# Patient Record
Sex: FEMALE | Race: WHITE | NOT HISPANIC OR LATINO
[De-identification: names, ages, dates, MRNs, and addresses within clinical notes are randomized per-mention and may not be internally consistent; named-entity substitution may affect disease eponyms.]

---

## 2017-03-23 ENCOUNTER — APPOINTMENT (OUTPATIENT)
Dept: ORTHOPEDIC SURGERY | Facility: CLINIC | Age: 77
End: 2017-03-23

## 2017-03-23 VITALS
WEIGHT: 163 LBS | BODY MASS INDEX: 23.34 KG/M2 | HEART RATE: 57 BPM | TEMPERATURE: 97.9 F | HEIGHT: 70 IN | SYSTOLIC BLOOD PRESSURE: 155 MMHG | DIASTOLIC BLOOD PRESSURE: 70 MMHG

## 2017-03-23 DIAGNOSIS — Z86.79 PERSONAL HISTORY OF OTHER DISEASES OF THE CIRCULATORY SYSTEM: ICD-10-CM

## 2017-03-23 DIAGNOSIS — E78.00 PURE HYPERCHOLESTEROLEMIA, UNSPECIFIED: ICD-10-CM

## 2017-03-23 DIAGNOSIS — M54.5 LOW BACK PAIN: ICD-10-CM

## 2017-03-23 DIAGNOSIS — M17.10 UNILATERAL PRIMARY OSTEOARTHRITIS, UNSPECIFIED KNEE: ICD-10-CM

## 2017-03-23 DIAGNOSIS — Z78.9 OTHER SPECIFIED HEALTH STATUS: ICD-10-CM

## 2017-03-23 DIAGNOSIS — Z85.9 PERSONAL HISTORY OF MALIGNANT NEOPLASM, UNSPECIFIED: ICD-10-CM

## 2017-03-23 RX ORDER — HYDRALAZINE HYDROCHLORIDE 50 MG/1
50 TABLET ORAL
Refills: 0 | Status: ACTIVE | COMMUNITY

## 2017-03-23 RX ORDER — LEVOTHYROXINE SODIUM 0.05 MG/1
50 TABLET ORAL
Refills: 0 | Status: ACTIVE | COMMUNITY

## 2017-03-23 RX ORDER — LISINOPRIL AND HYDROCHLOROTHIAZIDE TABLETS 20; 12.5 MG/1; MG/1
20-12.5 TABLET ORAL
Refills: 0 | Status: ACTIVE | COMMUNITY

## 2017-03-23 RX ORDER — METOPROLOL TARTRATE 50 MG/1
50 TABLET, FILM COATED ORAL
Refills: 0 | Status: ACTIVE | COMMUNITY

## 2017-05-17 ENCOUNTER — APPOINTMENT (OUTPATIENT)
Dept: ORTHOPEDIC SURGERY | Facility: CLINIC | Age: 77
End: 2017-05-17

## 2017-05-17 VITALS
DIASTOLIC BLOOD PRESSURE: 77 MMHG | BODY MASS INDEX: 23.34 KG/M2 | SYSTOLIC BLOOD PRESSURE: 166 MMHG | HEIGHT: 70 IN | WEIGHT: 163 LBS | HEART RATE: 60 BPM

## 2017-05-17 DIAGNOSIS — M41.80 OTHER FORMS OF SCOLIOSIS, SITE UNSPECIFIED: ICD-10-CM

## 2017-05-17 DIAGNOSIS — M60.9 MYOSITIS, UNSPECIFIED: ICD-10-CM

## 2017-05-17 RX ORDER — METHYLPREDNISOLONE 4 MG/1
4 TABLET ORAL
Qty: 1 | Refills: 0 | Status: DISCONTINUED | COMMUNITY
Start: 2017-05-11 | End: 2017-05-17

## 2017-05-17 RX ORDER — OXYCODONE AND ACETAMINOPHEN 5; 325 MG/1; MG/1
5-325 TABLET ORAL EVERY 6 HOURS
Qty: 20 | Refills: 0 | Status: ACTIVE | COMMUNITY
Start: 2017-05-17 | End: 1900-01-01

## 2017-08-04 ENCOUNTER — INPATIENT (INPATIENT)
Facility: HOSPITAL | Age: 77
LOS: 1 days | Discharge: HOME HEALTH | DRG: 200 | End: 2017-08-06
Attending: OBSTETRICS & GYNECOLOGY | Admitting: EMERGENCY MEDICINE
Payer: MEDICARE

## 2017-08-04 VITALS
WEIGHT: 160.06 LBS | HEART RATE: 71 BPM | TEMPERATURE: 98 F | DIASTOLIC BLOOD PRESSURE: 64 MMHG | OXYGEN SATURATION: 95 % | RESPIRATION RATE: 18 BRPM | HEIGHT: 70 IN | SYSTOLIC BLOOD PRESSURE: 107 MMHG

## 2017-08-04 DIAGNOSIS — J93.9 PNEUMOTHORAX, UNSPECIFIED: ICD-10-CM

## 2017-08-04 DIAGNOSIS — C54.1 MALIGNANT NEOPLASM OF ENDOMETRIUM: ICD-10-CM

## 2017-08-04 LAB
ALBUMIN SERPL ELPH-MCNC: 3.6 G/DL — SIGNIFICANT CHANGE UP (ref 3.3–5.2)
ALP SERPL-CCNC: 88 U/L — SIGNIFICANT CHANGE UP (ref 40–120)
ALT FLD-CCNC: 27 U/L — SIGNIFICANT CHANGE UP
ANION GAP SERPL CALC-SCNC: 20 MMOL/L — HIGH (ref 5–17)
AST SERPL-CCNC: 25 U/L — SIGNIFICANT CHANGE UP
BASOPHILS # BLD AUTO: 0 K/UL — SIGNIFICANT CHANGE UP (ref 0–0.2)
BASOPHILS NFR BLD AUTO: 0.7 % — SIGNIFICANT CHANGE UP (ref 0–2)
BILIRUB SERPL-MCNC: 0.1 MG/DL — LOW (ref 0.4–2)
BUN SERPL-MCNC: 26 MG/DL — HIGH (ref 8–20)
CALCIUM SERPL-MCNC: 9.1 MG/DL — SIGNIFICANT CHANGE UP (ref 8.6–10.2)
CHLORIDE SERPL-SCNC: 99 MMOL/L — SIGNIFICANT CHANGE UP (ref 98–107)
CO2 SERPL-SCNC: 23 MMOL/L — SIGNIFICANT CHANGE UP (ref 22–29)
CREAT SERPL-MCNC: 0.9 MG/DL — SIGNIFICANT CHANGE UP (ref 0.5–1.3)
EOSINOPHIL # BLD AUTO: 0.3 K/UL — SIGNIFICANT CHANGE UP (ref 0–0.5)
EOSINOPHIL NFR BLD AUTO: 3.9 % — SIGNIFICANT CHANGE UP (ref 0–6)
GLUCOSE SERPL-MCNC: 78 MG/DL — SIGNIFICANT CHANGE UP (ref 70–115)
HCT VFR BLD CALC: 33.5 % — LOW (ref 37–47)
HGB BLD-MCNC: 10.9 G/DL — LOW (ref 12–16)
LYMPHOCYTES # BLD AUTO: 1.7 K/UL — SIGNIFICANT CHANGE UP (ref 1–4.8)
LYMPHOCYTES # BLD AUTO: 22.5 % — SIGNIFICANT CHANGE UP (ref 20–55)
MCHC RBC-ENTMCNC: 30.4 PG — SIGNIFICANT CHANGE UP (ref 27–31)
MCHC RBC-ENTMCNC: 32.5 G/DL — SIGNIFICANT CHANGE UP (ref 32–36)
MCV RBC AUTO: 93.6 FL — SIGNIFICANT CHANGE UP (ref 81–99)
MONOCYTES # BLD AUTO: 0.9 K/UL — HIGH (ref 0–0.8)
MONOCYTES NFR BLD AUTO: 11.7 % — HIGH (ref 3–10)
NEUTROPHILS # BLD AUTO: 4.5 K/UL — SIGNIFICANT CHANGE UP (ref 1.8–8)
NEUTROPHILS NFR BLD AUTO: 60.8 % — SIGNIFICANT CHANGE UP (ref 37–73)
PLATELET # BLD AUTO: 485 K/UL — HIGH (ref 150–400)
POTASSIUM SERPL-MCNC: 3.2 MMOL/L — LOW (ref 3.5–5.3)
POTASSIUM SERPL-SCNC: 3.2 MMOL/L — LOW (ref 3.5–5.3)
PROT SERPL-MCNC: 7.3 G/DL — SIGNIFICANT CHANGE UP (ref 6.6–8.7)
RBC # BLD: 3.58 M/UL — LOW (ref 4.4–5.2)
RBC # FLD: 13.5 % — SIGNIFICANT CHANGE UP (ref 11–15.6)
SODIUM SERPL-SCNC: 142 MMOL/L — SIGNIFICANT CHANGE UP (ref 135–145)
WBC # BLD: 7.4 K/UL — SIGNIFICANT CHANGE UP (ref 4.8–10.8)
WBC # FLD AUTO: 7.4 K/UL — SIGNIFICANT CHANGE UP (ref 4.8–10.8)

## 2017-08-04 PROCEDURE — 99222 1ST HOSP IP/OBS MODERATE 55: CPT

## 2017-08-04 PROCEDURE — 99284 EMERGENCY DEPT VISIT MOD MDM: CPT

## 2017-08-04 PROCEDURE — 71020: CPT | Mod: 26

## 2017-08-04 PROCEDURE — 71250 CT THORAX DX C-: CPT | Mod: 26

## 2017-08-04 RX ORDER — OXYCODONE AND ACETAMINOPHEN 5; 325 MG/1; MG/1
1 TABLET ORAL EVERY 4 HOURS
Qty: 0 | Refills: 0 | Status: DISCONTINUED | OUTPATIENT
Start: 2017-08-04 | End: 2017-08-06

## 2017-08-04 RX ORDER — OXYCODONE AND ACETAMINOPHEN 5; 325 MG/1; MG/1
2 TABLET ORAL EVERY 4 HOURS
Qty: 0 | Refills: 0 | Status: DISCONTINUED | OUTPATIENT
Start: 2017-08-04 | End: 2017-08-06

## 2017-08-04 RX ORDER — HYDRALAZINE HCL 50 MG
50 TABLET ORAL
Qty: 0 | Refills: 0 | Status: DISCONTINUED | OUTPATIENT
Start: 2017-08-04 | End: 2017-08-06

## 2017-08-04 RX ORDER — ENOXAPARIN SODIUM 100 MG/ML
30 INJECTION SUBCUTANEOUS DAILY
Qty: 0 | Refills: 0 | Status: DISCONTINUED | OUTPATIENT
Start: 2017-08-04 | End: 2017-08-06

## 2017-08-04 RX ORDER — LEVOTHYROXINE SODIUM 125 MCG
50 TABLET ORAL DAILY
Qty: 0 | Refills: 0 | Status: DISCONTINUED | OUTPATIENT
Start: 2017-08-04 | End: 2017-08-06

## 2017-08-04 RX ORDER — ESCITALOPRAM OXALATE 10 MG/1
10 TABLET, FILM COATED ORAL DAILY
Qty: 0 | Refills: 0 | Status: DISCONTINUED | OUTPATIENT
Start: 2017-08-04 | End: 2017-08-06

## 2017-08-04 RX ORDER — ATORVASTATIN CALCIUM 80 MG/1
40 TABLET, FILM COATED ORAL AT BEDTIME
Qty: 0 | Refills: 0 | Status: DISCONTINUED | OUTPATIENT
Start: 2017-08-04 | End: 2017-08-06

## 2017-08-04 RX ORDER — VALSARTAN 80 MG/1
320 TABLET ORAL DAILY
Qty: 0 | Refills: 0 | Status: DISCONTINUED | OUTPATIENT
Start: 2017-08-04 | End: 2017-08-05

## 2017-08-04 RX ORDER — DOCUSATE SODIUM 100 MG
100 CAPSULE ORAL DAILY
Qty: 0 | Refills: 0 | Status: DISCONTINUED | OUTPATIENT
Start: 2017-08-04 | End: 2017-08-06

## 2017-08-04 RX ORDER — METOPROLOL TARTRATE 50 MG
50 TABLET ORAL
Qty: 0 | Refills: 0 | Status: DISCONTINUED | OUTPATIENT
Start: 2017-08-04 | End: 2017-08-05

## 2017-08-04 RX ORDER — HYDROCHLOROTHIAZIDE 25 MG
25 TABLET ORAL DAILY
Qty: 0 | Refills: 0 | Status: DISCONTINUED | OUTPATIENT
Start: 2017-08-04 | End: 2017-08-05

## 2017-08-04 RX ORDER — ASPIRIN/CALCIUM CARB/MAGNESIUM 324 MG
325 TABLET ORAL DAILY
Qty: 0 | Refills: 0 | Status: DISCONTINUED | OUTPATIENT
Start: 2017-08-04 | End: 2017-08-06

## 2017-08-04 NOTE — ED STATDOCS - PROGRESS NOTE DETAILS
75 yo F with lung mets followed by Dr Lane- had f/u petscan today and sent to ED for + PTX. Pt currently with chest tube in place. Pt asymptomatic. Pt seen by CT surgery and Dr Trinh PA- Will do CT chest to eval chest tube placement and PTX 77 yo F with Endometrial CA with lung mets followed by gyn oncologist Dr Lane- had f/u petscan today and sent to ED for + PTX. Pt currently with chest tube in place. Pt asymptomatic. Pt seen by CT surgery and Dr Trinh PA- Will do CT chest to eval chest tube placement and PTX

## 2017-08-04 NOTE — CONSULT NOTE ADULT - PROBLEM SELECTOR RECOMMENDATION 9
Patient evaluated by CT surgery who is recommending CT chest and CXR. Per their PA, they will reviewing imaging tonight and determine if intervention/admission is warranted. If patient is admitted she will be admitted to gyn onc service, Dr. Jose Trinh and CT surgery will follow. Case discussed with Dr. Trinh, Dr. Roche, ER attending and CT surgery PA.

## 2017-08-04 NOTE — ED ADULT NURSE REASSESSMENT NOTE - STATUS
pt back from CT scan, awaiting cardiothoracic NP to update pt on POC, pt and family made aware of pending admission. 20g RAC present.

## 2017-08-04 NOTE — ED STATDOCS - ATTENDING CONTRIBUTION TO CARE
I, Randi Cooper, performed the initial face to face bedside interview with this patient regarding history of present illness, review of symptoms and relevant past medical, social and family history.  I completed an independent physical examination.  I was the initial provider who evaluated this patient. I have signed out the follow up of any pending tests (i.e. labs, radiological studies) to the ACP.  I have communicated the patient’s plan of care and disposition with the ACP.  The history, relevant review of systems, past medical and surgical history, medical decision making, and physical examination was documented by the scribe in my presence and I attest to the accuracy of the documentation.

## 2017-08-04 NOTE — CONSULT NOTE ADULT - SUBJECTIVE AND OBJECTIVE BOX
GYNECOLOGIC ONCOLOGY CONSULT NOTE    76y   Last Menstrual Period in her 40's presents to the ER sent in by Dr. Trinh. Patient is well known to our practice with a hx of endometrial cancer since 2015. Patient had SHAHID RSO and staging with Dr. Trinh as well as vaginal radiation. Patient denies every receiving chemotherapy. She reports that she [facility] for which she had pleurocentesis on  as well as right pleural plaque peel and resection. Patient reports pathology was consistent with a metastatic process. Patient currently has chest tube in place, she went for surveillance PET imaging and incidentally a pneumothorax was discovered. Patient reports that she intermittently feels short of breath. Patient denies any other complaints currently. She denies chest pain, le pain, n/v, abdominal pain, fevers, pelvic pain or vaginal bleeding.         OB/GYN HISTORY:     Surgical History:    S/P BSO  s/p SHAHID and staging   s/p cardiac bypass  s/p VATS procedure  s/p partial thyroidectomy     Past Medical History:   Hyperlipidemia  Ovarian cyst  HTN (hypertension)  Hypothyroid  CAD  Endometrial Cancer        Ceclor (Rash)  shellfish (Rash)          FAMILY HISTORY: non contributory      Social History: non smoker, non drinker    REVIEW OF SYSTEMS:    CONSTITUTIONAL: No fever, weight loss, or fatigue  EYES: No eye pain, visual disturbances, or discharge  ENMT:  No difficulty hearing, tinnitus, vertigo; No sinus or throat pain  NECK: No pain or stiffness  BREASTS: No pain, masses, or nipple discharge  RESPIRATORY: No cough, wheezing, chills or hemoptysis  CARDIOVASCULAR: No chest pain, palpitations, dizziness, or leg swelling  GASTROINTESTINAL: No abdominal or epigastric pain. No nausea, vomiting, or hematemesis; No diarrhea or constipation. No melena or hematochezia.  GENITOURINARY: No dysuria, frequency, hematuria, or incontinence  NEUROLOGICAL: No headaches, memory loss, loss of strength, numbness, or tremors  SKIN: No itching, burning, rashes, or lesions   LYMPH NODES: No enlarged glands  ENDOCRINE: No heat or cold intolerance; No hair loss  MUSCULOSKELETAL: No joint pain or swelling; No muscle, back, or extremity pain  PSYCHIATRIC: No depression, anxiety, mood swings, or difficulty sleeping  HEME/LYMPH: No easy bruising, or bleeding gums  ALLERY AND IMMUNOLOGIC: No hives or eczema      MEDICATIONS  (STANDING):    MEDICATIONS  (PRN):      OBJECTIVE FINDINGS:    Vital Signs Last 24 Hrs  T(C): 36.8 (04 Aug 2017 16:01), Max: 36.8 (04 Aug 2017 16:01)  T(F): 98.3 (04 Aug 2017 16:01), Max: 98.3 (04 Aug 2017 16:01)  HR: 71 (04 Aug 2017 16:) (71 - 71)  BP: 107/64 (04 Aug 2017 16:) (107/64 - 107/64)  BP(mean): --  RR: 18 (04 Aug 2017 16:) (18 - 18)  SpO2: 95% (04 Aug 2017 16:01) (95% - 95%)    PHYSICAL EXAM:    GENERAL: NAD, well-developed  NERVOUS SYSTEM:  Alert & Oriented X3  CHEST/LUNG: Clear to ausculation bilaterally; No rales, rhonchi, wheezing, or rubs  HEART: Regular rate and rhythm; No murmurs, rubs, or gallops  ABDOMEN: Soft, Nontender, Nondistended  EXTREMITIES: No clubbing, cyanosis, or edema, Mary's sign negative  SKIN: No rashes or lesions      LABS: Pending                 Radiology : Pending

## 2017-08-04 NOTE — CONSULT NOTE ADULT - ASSESSMENT
76 year old female with hx endometrial cancer, recent pleurocentesis/right pleural plaque peel resection with path consistent with metastatic process, sent to ER for evaluation of incidental finding of  pneumothorax on PET CT from 8/4.

## 2017-08-04 NOTE — ED STATDOCS - RESPIRATORY, MLM
R lower lobe anterior decreased breath sounds. L chest wall pigtail that is coiled up under gauze. gauze it clean dry and intact. R lower lobe anterior decreased breath sounds. R chest wall pigtail that is coiled up under clean, dry gauze.

## 2017-08-04 NOTE — ED ADULT NURSE NOTE - OBJECTIVE STATEMENT
75 y/o female presents to ed reporting that she had pet scan today and called by dr amin office to come to ed for possible pneumothorax of right lung. patient reportedly hospitalized 7/18 for "5L of fluid on my lungs". Patient reports pleurocentesis and now has right lung chest tube placed which is drained by home care nurse two times a week. patient denies any shortness of breath; incidental finding. spo2 97% on room air. denies any complaints at this time. cxr per cardiothoracic unchanged; pending ct. #20 placed right ac labs drawn and sent. updated regarding plan of care . verbalized understanding.

## 2017-08-04 NOTE — CHART NOTE - NSCHARTNOTEFT_GEN_A_CORE
CT scan of chest reviewed and case discussed with Dr Akhtar.  Recommendations as discussed with covering sugical PA and ED staff: connect right chest tube to suction and admit to Dr Trinh (gyn onc) service.   Thoracic surgery will follow pt.  Pt remains in NAD, VSS. Discussion with patient and family re: current situation and plan took place with all verbalizing understanding and agreement.

## 2017-08-04 NOTE — CONSULT NOTE ADULT - SUBJECTIVE AND OBJECTIVE BOX
History of Present Illness:  76y Female without symptoms sent to ER by gyn oncologist after reports of surveillance PET scan reveal suspected pneumothorax  Pt + endometrial Ca w/ lung mets currently w/ L pleurex catheter placed 2 weeks ago @ [facility] Dr Jackson  for malignant effusion. Pt states pleurex has been drained 4 times since insertion at 500ml / 250 ml / 150 ml / and 75 ml yesterday    Past Medical History  Osteopenia  TIA (transient ischemic attack)  Hyperlipidemia  Ovarian cyst  HTN (hypertension)      Past Surgical History  S/P ovarian cystectomy: bilateral, years ago    Allergies: Ceclor (Rash)  shellfish (Rash)      SOCIAL HISTORY:  Smoker: [ ] Yes  [x ] No        PACK YEARS:                         WHEN QUIT?  ETOH use: [ ] Yes  [ x] No              FREQUENCY / QUANTITY:  Ilicit Drug use:  [ ] Yes  [x ] No  Live with: daughter  Assist device use: none          Review of Systems> negative  GENERAL:  Fevers[] chills[] sweats[] fatigue[] weight loss[] weight gain []                                        NEURO:  parathesias[] seizures []  syncope []  confusion []                                                                                  EYES: glasses[]  blurry vision[]  discharge[] pain[] glaucoma []                                                                            ENMT:  difficulty hearing []  vertigo[]  dysphagia[] epistaxis[] recent dental work []                                      CV:  chest pain[] palpitations[] LENNON [] diaphoresis [] edema[]                                                                                             RESPIRATORY:  wheezing[] SOB[] cough [] sputum[] hemoptysis[]                                                                    GI:  nausea[]  vomiting []  diarrhea[] constipation [] melena []                                                                        : hematuria[ ]  dysuria[ ] urgency[] incontinence[]                                                                                              MUSKULOSKELETAL:  arthritis[ ]  joint swelling [ ] muscle weakness [ ]                                                                  SKIN/BREAST:  rash[ ] itching [ ]  hair loss[ ] masses[ ]                                                                                                PSYCH:  dementia [ ] depresion [ ] anxiety[ ]                                                                                                                  HEME/LYMPH:  bruises easily[ ] enlarged lymph nodes[ ] tender lymph nodes[ ]                                                 ENDOCRINE:  cold intolerance[ ] heat intolerance[ ] polydipsia[ ]                                                                              PHYSICAL EXAM  Vital Signs Last 24 Hrs  T(C): 36.8 (04 Aug 2017 16:01), Max: 36.8 (04 Aug 2017 16:01)  T(F): 98.3 (04 Aug 2017 16:01), Max: 98.3 (04 Aug 2017 16:01)  HR: 71 (04 Aug 2017 16:01) (71 - 71)  BP: 107/64 (04 Aug 2017 16:01) (107/64 - 107/64)  BP(mean): --  RR: 18 (04 Aug 2017 16:01) (18 - 18)  SpO2: 95% (04 Aug 2017 16:01) (95% - 95%)    General: Well nourished, well developed, no acute distress.                                                         Neuro: Normal exam oriented to person/place & time with no focal motor or sensory  deficits.                    Eyes: Normal exam of conjunctiva & lids, pupils equally reactive.   ENT: Normal exam of nasal/oral mucosa with absence of cyanosis.   Neck: Normal exam of jugular veins, trachea & thyroid.   Chest: Normal lung exam with good air movement absence of wheezes, rales, or rhonchi: L pleurex catheter insitu                                                                         CV:  Auscultation: normal [ x] S3[ ] S4[ ] Irregular [ ] Rub[ ] Clicks[ ]  Murmurs none:[x ]systolic [ ]  diastolic [ ] holosystolic [ ]  Carotids: No Bruitsx[ ] Other____________ Abdominal Aorta: normal [ ] nonpalpable[ ]                                                                         GI: Normal exam of abdomen, liver & spleen with no noted masses or tenderness.                                                                                              Extremities: Normal no evidence of cyanosis or deformity Edema: none[x ]trace[ ]1+[ ]2+[ ]3+[ ]4+[ ]  Lower Extremity Pulses: Right[ ] Left[ ]Varicosities[ ]  SKIN : Normal exam to inspection & paplation.                                                               Assessment:  76y Female presents with reported pneumothorax by outside imaging  No symptoms, clinically stable w/o respiratory compromise    Plan:    CT chest w/o contrast  CXR  Await studies to determine treatment course  Discussed w/ Dr Akhtar

## 2017-08-04 NOTE — ED CLERICAL - NS ED CLERK NOTE PRE-ARRIVAL INFORMATION; ADDITIONAL PRE-ARRIVAL INFORMATION
PA called: being sent in for pneumothorax found on outpatient CT. Dr. Trinh would like patient admitted to CT surg

## 2017-08-04 NOTE — ED STATDOCS - OBJECTIVE STATEMENT
77 y/o F w/ PMHx of lung CA, TIA, HTN, and HLD presents to the ED c/o a possible pneumo thorax. Pt states she has mild difficulty breathing. Dr Trinh referred the pt to the ED because there is air in her R lung during a CT today. Pt has a chest tube in place. Pt had fluid drained yesterday. There was a small amount of fluid drained and some air Pt denies Gayathri KRISHNA at bedside Pt not in respiratory distress. 77 y/o F w/ PMHx of lung CA, TIA, HTN, and HLD presents to the ED c/o a possible pneumo thorax and mild difficulty breathing x2 weeks. Dr Trinh referred the pt to the ED because there is air in her R lung during a CT today. Pt has a chest tube in place. Pt had fluid drained yesterday. There was a small amount of fluid drained and some air. Gayathri PA at bedside Pt not in respiratory distress. Pt denies cough, fever or any other complaints at this time. 77 y/o F w/ PMHx of lung mets, TIA, HTN, and HLD presents to the ED c/o a possible pneumo thorax and mild difficulty breathing x2 weeks. Dr Trinh referred the pt to the ED because there is air in her R lung during a CT today. Pt has a chest tube in place. Pt had fluid drained yesterday. There was a small amount of fluid drained and some air. Gayathri PA at bedside Pt not in respiratory distress. Pt denies cough, fever or any other complaints at this time. 75 y/o F w/ PMHx of uterin CA with lung mets, TIA, HTN, and HLD presents to the ED c/o a possible pneumo thorax and mild difficulty breathing x2 weeks. Dr Trinh referred the pt to the ED because there is air in her R lung during a routine PET scan performed today. Pt has had a chest tube in place for several weeks, placed for R pleural effusion (done at [facility]) Pt has been having fluid drained at home by visiting nurse - just drained yesterday--drainage has been progressively decreasing and family reports some air bubbles comign out yesterday. Gayathri PA at bedside Pt not in respiratory distress. Pt denies cough, fever, active SOB or any other complaints at this time.

## 2017-08-04 NOTE — ED STATDOCS - PMH
HTN (hypertension)    Hyperlipidemia    Lung cancer    Osteopenia    Ovarian cyst    TIA (transient ischemic attack)

## 2017-08-04 NOTE — ED STATDOCS - MEDICAL DECISION MAKING DETAILS
Consult Dr. Decker for management of pneumothorax. Pt sent for possible PTX found incidentally on routien PET scan. Pt has R pigtail catheter for pleural effusion placed several weeks ago. Pt denies any current dyspnea. Consult Dr. Decker for recs.

## 2017-08-05 DIAGNOSIS — J93.9 PNEUMOTHORAX, UNSPECIFIED: ICD-10-CM

## 2017-08-05 RX ORDER — POTASSIUM CHLORIDE 20 MEQ
10 PACKET (EA) ORAL ONCE
Qty: 0 | Refills: 0 | Status: COMPLETED | OUTPATIENT
Start: 2017-08-05 | End: 2017-08-05

## 2017-08-05 RX ORDER — VALSARTAN 80 MG/1
320 TABLET ORAL ONCE
Qty: 0 | Refills: 0 | Status: COMPLETED | OUTPATIENT
Start: 2017-08-05 | End: 2017-08-05

## 2017-08-05 RX ORDER — METOPROLOL TARTRATE 50 MG
50 TABLET ORAL
Qty: 0 | Refills: 0 | Status: DISCONTINUED | OUTPATIENT
Start: 2017-08-05 | End: 2017-08-06

## 2017-08-05 RX ORDER — HYDROCHLOROTHIAZIDE 25 MG
25 TABLET ORAL ONCE
Qty: 0 | Refills: 0 | Status: COMPLETED | OUTPATIENT
Start: 2017-08-05 | End: 2017-08-05

## 2017-08-05 RX ORDER — ZOLPIDEM TARTRATE 10 MG/1
5 TABLET ORAL AT BEDTIME
Qty: 0 | Refills: 0 | Status: DISCONTINUED | OUTPATIENT
Start: 2017-08-05 | End: 2017-08-06

## 2017-08-05 RX ORDER — VALSARTAN 80 MG/1
320 TABLET ORAL DAILY
Qty: 0 | Refills: 0 | Status: DISCONTINUED | OUTPATIENT
Start: 2017-08-05 | End: 2017-08-06

## 2017-08-05 RX ORDER — HYDROCHLOROTHIAZIDE 25 MG
25 TABLET ORAL DAILY
Qty: 0 | Refills: 0 | Status: DISCONTINUED | OUTPATIENT
Start: 2017-08-05 | End: 2017-08-06

## 2017-08-05 RX ORDER — POTASSIUM CHLORIDE 20 MEQ
10 PACKET (EA) ORAL
Qty: 0 | Refills: 0 | Status: COMPLETED | OUTPATIENT
Start: 2017-08-05 | End: 2017-08-05

## 2017-08-05 RX ADMIN — Medication 50 MILLIGRAM(S): at 05:19

## 2017-08-05 RX ADMIN — ATORVASTATIN CALCIUM 40 MILLIGRAM(S): 80 TABLET, FILM COATED ORAL at 21:17

## 2017-08-05 RX ADMIN — Medication 50 MILLIGRAM(S): at 05:20

## 2017-08-05 RX ADMIN — ESCITALOPRAM OXALATE 10 MILLIGRAM(S): 10 TABLET, FILM COATED ORAL at 12:41

## 2017-08-05 RX ADMIN — ZOLPIDEM TARTRATE 5 MILLIGRAM(S): 10 TABLET ORAL at 23:25

## 2017-08-05 RX ADMIN — Medication 25 MILLIGRAM(S): at 21:17

## 2017-08-05 RX ADMIN — Medication 50 MILLIGRAM(S): at 18:19

## 2017-08-05 RX ADMIN — ENOXAPARIN SODIUM 30 MILLIGRAM(S): 100 INJECTION SUBCUTANEOUS at 21:17

## 2017-08-05 RX ADMIN — Medication 25 MILLIGRAM(S): at 00:27

## 2017-08-05 RX ADMIN — Medication 50 MICROGRAM(S): at 05:20

## 2017-08-05 RX ADMIN — Medication 100 MILLIEQUIVALENT(S): at 02:26

## 2017-08-05 RX ADMIN — Medication 100 MILLIEQUIVALENT(S): at 04:29

## 2017-08-05 RX ADMIN — Medication 100 MILLIGRAM(S): at 12:41

## 2017-08-05 RX ADMIN — Medication 100 MILLIEQUIVALENT(S): at 03:26

## 2017-08-05 RX ADMIN — VALSARTAN 320 MILLIGRAM(S): 80 TABLET ORAL at 00:28

## 2017-08-05 RX ADMIN — Medication 325 MILLIGRAM(S): at 12:41

## 2017-08-05 RX ADMIN — Medication 50 MILLIGRAM(S): at 18:18

## 2017-08-05 RX ADMIN — ZOLPIDEM TARTRATE 5 MILLIGRAM(S): 10 TABLET ORAL at 00:27

## 2017-08-05 RX ADMIN — Medication 100 MILLIEQUIVALENT(S): at 21:18

## 2017-08-05 NOTE — PROGRESS NOTE ADULT - PROBLEM SELECTOR PLAN 1
- Appreciate CT surg following  - Plan per them is to clamp tube today, check CXR tomorrow (8/6); and if same size or smaller pneumo, will connect as pleurex and d/c home tomorrow  - Encourage OOB and Incentive Spirometry

## 2017-08-05 NOTE — H&P ADULT - RS GEN PE MLT RESP DETAILS PC
no wheezes/airway patent/no rales/clear to auscultation bilaterally/good air movement/no intercostal retractions/no rhonchi/breath sounds equal/respirations non-labored/normal

## 2017-08-05 NOTE — H&P ADULT - ASSESSMENT
76 year old female found to have pneumothorax  - admit to Dr. Trinh  - CT surgery saw patient, suggest connecting chest tube to suction  - restart home medications  - monitor respiratory status  - regular diet  - encourage IS, deep breathing  - DVT prophylaxis

## 2017-08-05 NOTE — H&P ADULT - HISTORY OF PRESENT ILLNESS
76y  LMP in 40's presents to the ER sent in by Dr. Trinh. Patient has a history of endometrial cancer since 2015, had SHAHID RSO and staging with Dr. Trinh and vaginal radiation, never had chemotherapy. Patient reports that she was [facility] for which she had pleurocentesis on  as well as right pleural plaque peel and resection. Patient reports pathology was consistent with a metastatic process. Patient currently has chest tube in place, she went for surveillance PET imaging and incidentally a pneumothorax was discovered. Patient reports that she intermittently feels short of breath. Patient denies any other complaints currently. She denies chest pain, le pain, n/v, abdominal pain, fevers, pelvic pain or vaginal bleeding.

## 2017-08-05 NOTE — PROGRESS NOTE ADULT - SUBJECTIVE AND OBJECTIVE BOX
GYNECOLOGIC ONCOLOGY PROGRESS NOTE    Hospital Day# 2    PROBLEMS:  Pneumothorax, unspecified  Endometrial cancer      Pt seen and examined at bedside.     SUBJECTIVE:    Patient is without complaints.  Pain well-controlled.  Flatus:Yes  Denies Nausea, Vomiting or Diarrhea.  Denies shortness of breath, chest pain or dyspnea on exertion.  Tolerating diet.    OBJECTIVE:     VITALS:  T(F): 98.2 (08-05-17 @ 08:25), Max: 98.3 (08-04-17 @ 16:01)  HR: 57 (08-05-17 @ 08:25) (55 - 75)  BP: 135/68 (08-05-17 @ 08:25) (107/64 - 168/99)  RR: 16 (08-05-17 @ 08:25) (15 - 18)  SpO2: 96% (08-05-17 @ 08:25) (95% - 98%)      I&O's Summary    04 Aug 2017 07:01  -  05 Aug 2017 07:00  --------------------------------------------------------  IN: 420 mL / OUT: 0 mL / NET: 420 mL        MEDICATIONS  (STANDING):  enoxaparin Injectable 30 milliGRAM(s) SubCutaneous daily  docusate sodium 100 milliGRAM(s) Oral daily  aspirin 325 milliGRAM(s) Oral daily  atorvastatin 40 milliGRAM(s) Oral at bedtime  escitalopram 10 milliGRAM(s) Oral daily  hydrALAZINE 50 milliGRAM(s) Oral two times a day  levothyroxine 50 MICROGram(s) Oral daily  metoprolol 50 milliGRAM(s) Oral two times a day  valsartan 320 milliGRAM(s) Oral daily  hydrochlorothiazide 25 milliGRAM(s) Oral daily  potassium chloride  10 mEq/100 mL IVPB 10 milliEquivalent(s) IV Intermittent once    MEDICATIONS  (PRN):  oxyCODONE    5 mG/acetaminophen 325 mG 1 Tablet(s) Oral every 4 hours PRN Moderate Pain (4 - 6)  oxyCODONE    5 mG/acetaminophen 325 mG 2 Tablet(s) Oral every 4 hours PRN Severe Pain (7 - 10)  zolpidem 5 milliGRAM(s) Oral at bedtime PRN Insomnia      Physical Exam:  Constitutional: NAD  Pulmonary: clear to auscultation bilaterally   Cardiovascular: Regular rate and rhythm   Abdomen: soft, non-tender, non-distended, normal bowel sounds, R chest tube in situ, currently clamped  Extremities: no lower extremity edema or calve tenderness, Mary's sign negative.      LABS:                        10.9   7.4   )-----------( 485      ( 04 Aug 2017 18:28 )             33.5     08-04    142  |  99  |  26.0<H>  ----------------------------<  78  3.2<L>   |  23.0  |  0.90    Ca    9.1      04 Aug 2017 18:28    TPro  7.3  /  Alb  3.6  /  TBili  0.1<L>  /  DBili  x   /  AST  25  /  ALT  27  /  AlkPhos  88  08-04          RADIOLOGY & ADDITIONAL TESTS:

## 2017-08-06 ENCOUNTER — TRANSCRIPTION ENCOUNTER (OUTPATIENT)
Age: 77
End: 2017-08-06

## 2017-08-06 VITALS
SYSTOLIC BLOOD PRESSURE: 133 MMHG | DIASTOLIC BLOOD PRESSURE: 71 MMHG | RESPIRATION RATE: 18 BRPM | TEMPERATURE: 98 F | HEART RATE: 68 BPM | OXYGEN SATURATION: 96 %

## 2017-08-06 PROCEDURE — 71046 X-RAY EXAM CHEST 2 VIEWS: CPT

## 2017-08-06 PROCEDURE — 85027 COMPLETE CBC AUTOMATED: CPT

## 2017-08-06 PROCEDURE — 71045 X-RAY EXAM CHEST 1 VIEW: CPT

## 2017-08-06 PROCEDURE — 80053 COMPREHEN METABOLIC PANEL: CPT

## 2017-08-06 PROCEDURE — 71010: CPT | Mod: 26

## 2017-08-06 PROCEDURE — 71250 CT THORAX DX C-: CPT

## 2017-08-06 PROCEDURE — 99285 EMERGENCY DEPT VISIT HI MDM: CPT | Mod: 25

## 2017-08-06 PROCEDURE — 71010: CPT | Mod: 26,77

## 2017-08-06 PROCEDURE — 36415 COLL VENOUS BLD VENIPUNCTURE: CPT

## 2017-08-06 RX ORDER — ZOLPIDEM TARTRATE 10 MG/1
1 TABLET ORAL
Qty: 0 | Refills: 0 | COMMUNITY
Start: 2017-08-06

## 2017-08-06 RX ORDER — DOCUSATE SODIUM 100 MG
1 CAPSULE ORAL
Qty: 0 | Refills: 0 | COMMUNITY
Start: 2017-08-06

## 2017-08-06 RX ORDER — HYDRALAZINE HCL 50 MG
1 TABLET ORAL
Qty: 0 | Refills: 0 | COMMUNITY
Start: 2017-08-06

## 2017-08-06 RX ORDER — ASPIRIN/CALCIUM CARB/MAGNESIUM 324 MG
1 TABLET ORAL
Qty: 0 | Refills: 0 | COMMUNITY
Start: 2017-08-06

## 2017-08-06 RX ORDER — ATORVASTATIN CALCIUM 80 MG/1
1 TABLET, FILM COATED ORAL
Qty: 0 | Refills: 0 | COMMUNITY
Start: 2017-08-06

## 2017-08-06 RX ORDER — ESCITALOPRAM OXALATE 10 MG/1
1 TABLET, FILM COATED ORAL
Qty: 0 | Refills: 0 | COMMUNITY
Start: 2017-08-06

## 2017-08-06 RX ORDER — METOPROLOL TARTRATE 50 MG
1 TABLET ORAL
Qty: 0 | Refills: 0 | COMMUNITY
Start: 2017-08-06

## 2017-08-06 RX ORDER — VALSARTAN 80 MG/1
1 TABLET ORAL
Qty: 0 | Refills: 0 | COMMUNITY
Start: 2017-08-06

## 2017-08-06 RX ORDER — LEVOTHYROXINE SODIUM 125 MCG
1 TABLET ORAL
Qty: 0 | Refills: 0 | COMMUNITY
Start: 2017-08-06

## 2017-08-06 RX ADMIN — Medication 50 MICROGRAM(S): at 05:22

## 2017-08-06 RX ADMIN — Medication 50 MILLIGRAM(S): at 05:22

## 2017-08-06 NOTE — DISCHARGE NOTE ADULT - PLAN OF CARE
improve condition Follow-up with CT surgeon Dr. Manuel Martinez 8/8/17  Please follow-up with PMD for continued management of home medications Follow-up with CT surgeon Dr. Manuel Martinez 8/8/17  Please follow-up with PMD for continued management of home medications  F/u with Dr. Trinh for routine surveillance exam

## 2017-08-06 NOTE — DISCHARGE NOTE ADULT - HOSPITAL COURSE
75yo female with hx endometrial cancer was sent to the ER with incidental finding of new pneumothorax discovered on surveillance PET/CT. Patient was recently admitted to [facility] with SOB for which she had pleurocentesis on 7/18 as well as right pleural plaque peel and resection. Patient reports pathology was consistent with a metastatic process. Patient had chest tube in place on admission and reported intermittently feeling short of breath. She was seen and followed by CT surgery during hospital stay. She had CT chest revealing a 50% apical anterior right upper lobe pneumothorax. The right chest tube   within the right interlobular fissure and right lung base. CT was repositioned, SOB resolved and pleurX catheter was capped. Patient is scheduled to see her current CT surgeon Dr. Jackson from [facility] this Tuesday 8/8/17. Patient without SOB at discharge.

## 2017-08-06 NOTE — DISCHARGE NOTE ADULT - CARE PROVIDER_API CALL
Joe Trinh), Gynecologic Oncology; Obstetrics and Gynecology  [address] [place] [address]  Phone: [phone]  Fax: [phone]

## 2017-08-06 NOTE — PROGRESS NOTE ADULT - ATTENDING COMMENTS
Pt seen and examined w/PA and agree with above assessment and plan.  Stable for d/c home w/f/u w/CT Surg at [facility] (Dr. Jackson) tomorrow.

## 2017-08-06 NOTE — CHART NOTE - NSCHARTNOTEFT_GEN_A_CORE
Cardiothoracic NP   75 y/o female w/ endometrial Ca w/ mets to lung presents to ER 8/4 with  incidental finding of PTX on routine PET scan Pt has indwelling pleurex catheter for metastaic effusion  Imaging remains unchanged, no apparent air leak , pulmonary status stable.   Plan to cap pleurex catheter, cleared for discharge home today  Recommend follow up w/ thoracic surgeon at [facility] (Dr Jackson ?) who pt had been following  Please call for any further issues    Iris Robertson NP  CTS  [phone]

## 2017-08-06 NOTE — DISCHARGE NOTE ADULT - PATIENT PORTAL LINK FT
“You can access the Follow[facility] Patient Portal, offered by [facility], by registering with the following website: [email or web address]”

## 2017-08-06 NOTE — DISCHARGE NOTE ADULT - CARE PLAN
Principal Discharge DX:	Pneumothorax, unspecified  Goal:	improve condition  Instructions for follow-up, activity and diet:	Follow-up with CT surgeon Dr. Manuel Martinez 8/8/17  Please follow-up with PMD for continued management of home medications Principal Discharge DX:	Pneumothorax, unspecified  Goal:	improve condition  Instructions for follow-up, activity and diet:	Follow-up with CT surgeon Dr. Manuel Martinez 8/8/17  Please follow-up with PMD for continued management of home medications  F/u with Dr. Trinh for routine surveillance exam

## 2017-08-06 NOTE — DISCHARGE NOTE ADULT - MEDICATION SUMMARY - MEDICATIONS TO TAKE
I will START or STAY ON the medications listed below when I get home from the hospital:    aspirin 325 mg oral tablet  -- 1 tab(s) by mouth once a day  -- Indication: For Per PMD    valsartan 320 mg oral tablet  -- 1 tab(s) by mouth once a day  -- Indication: For Per PMD    escitalopram 10 mg oral tablet  -- 1 tab(s) by mouth once a day  -- Indication: For Per PMD    atorvastatin 40 mg oral tablet  -- 1 tab(s) by mouth once a day (at bedtime)  -- Indication: For Per PMD    zolpidem 5 mg oral tablet  -- 1 tab(s) by mouth once a day (at bedtime), As needed, Insomnia  -- Indication: For Per PMD    metoprolol tartrate 50 mg oral tablet  -- 1 tab(s) by mouth 2 times a day  -- Indication: For Per PMD    hydroCHLOROthiazide 25 mg oral tablet  -- 1 tab(s) by mouth once a day  -- Indication: For Per PMD    docusate sodium 100 mg oral capsule  -- 1 cap(s) by mouth once a day  -- Indication: For Per PMD    levothyroxine 50 mcg (0.05 mg) oral tablet  -- 1 tab(s) by mouth once a day  -- Indication: For Per PMD    hydrALAZINE 50 mg oral tablet  -- 1 tab(s) by mouth 2 times a day  -- Indication: For Per PMD

## 2017-08-06 NOTE — PROGRESS NOTE ADULT - SUBJECTIVE AND OBJECTIVE BOX
GYNECOLOGIC ONCOLOGY PROGRESS NOTE      PROBLEMS:  Pneumothorax, unspecified type  Endometrial cancer    Pt seen and examined at bedside.     SUBJECTIVE:    Patient is without complaints.  Pain well-controlled.  Denies Nausea, Vomiting or Diarrhea.  Denies shortness of breath, chest pain or dyspnea on exertion.  Tolerating diet.    OBJECTIVE:     VITALS:  T(F): 98 (08-06-17 @ 09:10), Max: 98.7 (08-05-17 @ 17:02)  HR: 68 (08-06-17 @ 09:10) (52 - 68)  BP: 133/71 (08-06-17 @ 09:10) (130/70 - 145/75)  RR: 18 (08-06-17 @ 09:10) (16 - 18)  SpO2: 96% (08-06-17 @ 09:10) (7% - 99%)      I&O's Summary    05 Aug 2017 07:01  -  06 Aug 2017 07:00  --------------------------------------------------------  IN: 480 mL / OUT: 300 mL / NET: 180 mL      MEDICATIONS  (STANDING):  enoxaparin Injectable 30 milliGRAM(s) SubCutaneous daily  docusate sodium 100 milliGRAM(s) Oral daily  aspirin 325 milliGRAM(s) Oral daily  atorvastatin 40 milliGRAM(s) Oral at bedtime  escitalopram 10 milliGRAM(s) Oral daily  hydrALAZINE 50 milliGRAM(s) Oral two times a day  levothyroxine 50 MICROGram(s) Oral daily  metoprolol 50 milliGRAM(s) Oral two times a day  valsartan 320 milliGRAM(s) Oral daily  hydrochlorothiazide 25 milliGRAM(s) Oral daily    MEDICATIONS  (PRN):  oxyCODONE    5 mG/acetaminophen 325 mG 1 Tablet(s) Oral every 4 hours PRN Moderate Pain (4 - 6)  oxyCODONE    5 mG/acetaminophen 325 mG 2 Tablet(s) Oral every 4 hours PRN Severe Pain (7 - 10)  zolpidem 5 milliGRAM(s) Oral at bedtime PRN Insomnia      Physical Exam:  Constitutional: NAD  Pulmonary: Clear to auscultation bilaterally   Cardiovascular: Regular rate and rhythm   Abdomen: soft, non-tender, non-distended, normal bowel sounds  Extremities: no lower extremity edema or calve tenderness, Mary's sign negative.      LABS:                        10.9   7.4   )-----------( 485      ( 04 Aug 2017 18:28 )             33.5     08-04    142  |  99  |  26.0<H>  ----------------------------<  78  3.2<L>   |  23.0  |  0.90    Ca    9.1      04 Aug 2017 18:28    TPro  7.3  /  Alb  3.6  /  TBili  0.1<L>  /  DBili  x   /  AST  25  /  ALT  27  /  AlkPhos  88  08-04

## 2017-08-10 ENCOUNTER — OUTPATIENT (OUTPATIENT)
Dept: OUTPATIENT SERVICES | Facility: HOSPITAL | Age: 77
LOS: 1 days | Discharge: HOME | End: 2017-08-10
Payer: MEDICARE

## 2017-08-10 DIAGNOSIS — C34.90 MALIGNANT NEOPLASM OF UNSPECIFIED PART OF UNSPECIFIED BRONCHUS OR LUNG: ICD-10-CM

## 2017-08-14 ENCOUNTER — APPOINTMENT (OUTPATIENT)
Dept: HEMATOLOGY ONCOLOGY | Facility: CLINIC | Age: 77
End: 2017-08-14

## 2017-08-18 ENCOUNTER — RESULT REVIEW (OUTPATIENT)
Age: 77
End: 2017-08-18

## 2017-08-18 PROCEDURE — 88321 CONSLTJ&REPRT SLD PREP ELSWR: CPT

## 2017-09-06 ENCOUNTER — APPOINTMENT (OUTPATIENT)
Dept: ORTHOPEDIC SURGERY | Facility: CLINIC | Age: 77
End: 2017-09-06

## 2017-09-08 ENCOUNTER — RESULT REVIEW (OUTPATIENT)
Age: 77
End: 2017-09-08

## 2017-09-08 PROCEDURE — 88321 CONSLTJ&REPRT SLD PREP ELSWR: CPT

## 2017-11-13 ENCOUNTER — OTHER (OUTPATIENT)
Age: 77
End: 2017-11-13

## 2018-01-10 ENCOUNTER — APPOINTMENT (OUTPATIENT)
Dept: MAMMOGRAPHY | Facility: CLINIC | Age: 78
End: 2018-01-10
Payer: MEDICARE

## 2018-01-10 ENCOUNTER — OUTPATIENT (OUTPATIENT)
Dept: OUTPATIENT SERVICES | Facility: HOSPITAL | Age: 78
LOS: 1 days | End: 2018-01-10
Payer: MEDICARE

## 2018-01-10 DIAGNOSIS — Z12.31 ENCOUNTER FOR SCREENING MAMMOGRAM FOR MALIGNANT NEOPLASM OF BREAST: ICD-10-CM

## 2018-01-10 PROCEDURE — 77067 SCR MAMMO BI INCL CAD: CPT | Mod: 26

## 2018-01-10 PROCEDURE — 77067 SCR MAMMO BI INCL CAD: CPT

## 2018-01-10 PROCEDURE — 77063 BREAST TOMOSYNTHESIS BI: CPT

## 2018-01-10 PROCEDURE — 77063 BREAST TOMOSYNTHESIS BI: CPT | Mod: 26

## 2020-05-20 NOTE — CONSULT NOTE ADULT - PROBLEM/RECOMMENDATION-1
DISPLAY PLAN FREE TEXT [facility] Pregnancy And Lactation Text: The risk during pregnancy and breastfeeding is uncertain with this medication.

## 2020-09-21 NOTE — CONSULT NOTE ADULT - PROBLEM/RECOMMENDATION-2
DISPLAY PLAN FREE TEXT Mohs Histo Method Verbiage: Each section was then chromacoded and processed in the Mohs lab using the Mohs protocol and submitted for frozen section.